# Patient Record
Sex: FEMALE | Employment: PART TIME | URBAN - METROPOLITAN AREA
[De-identification: names, ages, dates, MRNs, and addresses within clinical notes are randomized per-mention and may not be internally consistent; named-entity substitution may affect disease eponyms.]

---

## 2017-02-10 ENCOUNTER — OFFICE VISIT (OUTPATIENT)
Dept: PEDIATRICS | Facility: CLINIC | Age: 52
End: 2017-02-10
Payer: COMMERCIAL

## 2017-02-10 VITALS
HEIGHT: 61 IN | DIASTOLIC BLOOD PRESSURE: 70 MMHG | OXYGEN SATURATION: 97 % | HEART RATE: 84 BPM | BODY MASS INDEX: 33.76 KG/M2 | TEMPERATURE: 98.2 F | SYSTOLIC BLOOD PRESSURE: 118 MMHG | WEIGHT: 178.8 LBS

## 2017-02-10 DIAGNOSIS — N64.4 BREAST PAIN: Primary | ICD-10-CM

## 2017-02-10 DIAGNOSIS — I83.813 VARICOSE VEINS OF BILATERAL LOWER EXTREMITIES WITH PAIN: ICD-10-CM

## 2017-02-10 DIAGNOSIS — H53.9 VISION CHANGES: ICD-10-CM

## 2017-02-10 DIAGNOSIS — K29.50 CHRONIC GASTRITIS, PRESENCE OF BLEEDING UNSPECIFIED, UNSPECIFIED GASTRITIS TYPE: ICD-10-CM

## 2017-02-10 DIAGNOSIS — M79.7 FIBROMYALGIA: ICD-10-CM

## 2017-02-10 PROCEDURE — 99215 OFFICE O/P EST HI 40 MIN: CPT | Performed by: INTERNAL MEDICINE

## 2017-02-10 RX ORDER — AMITRIPTYLINE HYDROCHLORIDE 10 MG/1
TABLET ORAL
Qty: 90 TABLET | Refills: 3 | Status: SHIPPED | OUTPATIENT
Start: 2017-02-10 | End: 2017-11-14

## 2017-02-10 RX ORDER — NAPROXEN 500 MG/1
500 TABLET ORAL 2 TIMES DAILY WITH MEALS
Qty: 60 TABLET | Refills: 1 | Status: SHIPPED | OUTPATIENT
Start: 2017-02-10 | End: 2017-11-14

## 2017-02-10 RX ORDER — IBUPROFEN 600 MG/1
600 TABLET, FILM COATED ORAL
COMMUNITY
Start: 2012-07-18 | End: 2017-11-14

## 2017-02-10 NOTE — PATIENT INSTRUCTIONS
Stop using ibuprofen and start taking naprosyn 1 pill twice daily with food.  They will call you to schedule.  You can call radiology scheduling at [phone] to schedule yourself.    Start taking omeprazole - take it once daily about 30-60 mins before a meal.      They will call you to schedule with the surgeon about your legs.      Please call the eye doctor, you should be seen soon to check your vision change.    Start taking the amitriptyline 1 tab 30-60 mins before bed to help with sleep and pain.  You can gradually increase the dose up to 3 tabs nightly.
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
## 2017-02-10 NOTE — NURSING NOTE
"Chief Complaint   Patient presents with     Breast Mass       Initial /70 mmHg  Pulse 84  Temp(Src) 98.2  F (36.8  C) (Tympanic)  Ht 5' 1\" (1.549 m)  Wt 178 lb 12.8 oz (81.103 kg)  BMI 33.80 kg/m2  SpO2 97% Estimated body mass index is 33.8 kg/(m^2) as calculated from the following:    Height as of this encounter: 5' 1\" (1.549 m).    Weight as of this encounter: 178 lb 12.8 oz (81.103 kg).  Medication Reconciliation: complete  "

## 2017-02-10 NOTE — MR AVS SNAPSHOT
After Visit Summary   2/10/2017    Perlita Rashid    MRN: [id number]           Patient Information     Date Of Birth          1965        Visit Information        Provider Department      2/10/2017 2:15 PM Reema Shen MD; [facility]        Today's Diagnoses     Breast pain    -  1     Vision changes         Varicose veins of bilateral lower extremities with pain         Chronic gastritis, presence of bleeding unspecified, unspecified gastritis type         Fibromyalgia           Care Instructions    Stop using ibuprofen and start taking naprosyn 1 pill twice daily with food.  They will call you to schedule.  You can call radiology scheduling at [phone] to schedule yourself.    Start taking omeprazole - take it once daily about 30-60 mins before a meal.      They will call you to schedule with the surgeon about your legs.      Please call the eye doctor, you should be seen soon to check your vision change.    Start taking the amitriptyline 1 tab 30-60 mins before bed to help with sleep and pain.  You can gradually increase the dose up to 3 tabs nightly.        Follow-ups after your visit        Additional Services     OPHTHALMOLOGY ADULT REFERRAL       Your provider has referred you to: [facility]: [facility] [place] [facility] [phone]   [email or web address]    Please be aware that coverage of these services is subject to the terms and limitations of your health insurance plan.  Call member services at your health plan with any benefit or coverage questions.      Please bring the following with you to your appointment:    (1) Any X-Rays, CTs or MRIs which have been performed.  Contact the facility where they were done to arrange for  prior to your scheduled appointment.    (2) List of current medications  (3) This referral request   (4) Any documents/labs given to you for this referral            VASCULAR SURGERY REFERRAL       Your  "provider has referred you to: **[facility] [phone] - Varicose Veins & None - Please Order Appropriate Testing   [email or web address]    Please be aware that coverage of these services is subject to the terms and limitations of your health insurance plan.  Call member services at your health plan with any benefit or coverage questions.      Please bring the following with you to your appointment:    (1) Any X-Rays, CTs or MRIs which have been performed.  Contact the facility where they were done to arrange for  prior to your scheduled appointment.    (2) List of current medications   (3) This referral request   (4) Any documents/labs given to you for this referral                  Future tests that were ordered for you today     Open Future Orders        Priority Expected Expires Ordered    MA Diagnostic Digital Bilateral Routine  2/10/2018 2/10/2017            Who to contact     If you have questions or need follow up information about today's clinic visit or your schedule please contact [facility] directly at [phone].  Normal or non-critical lab and imaging results will be communicated to you by MyChart, letter or phone within 4 business days after the clinic has received the results. If you do not hear from us within 7 days, please contact the clinic through MyChart or phone. If you have a critical or abnormal lab result, we will notify you by phone as soon as possible.  Submit refill requests through [employer] or call your pharmacy and they will forward the refill request to us. Please allow 3 business days for your refill to be completed.          Additional Information About Your Visit        [employer] Information     2[employer] lets you send messages to your doctor, view your test results, renew your prescriptions, schedule appointments and more. To sign up, go to [email or web address] . Click on \"Log in\" on the left side of the screen, " "which will take you to the Welcome page. Then click on \"Sign up Now\" on the right side of the page.     You will be asked to enter the access code listed below, as well as some personal information. Please follow the directions to create your username and password.     Your access code is: [email or web address]  Expires: 2017  3:23 PM     Your access code will  in 90 days. If you need help or a new code, please call your [facility] or [phone].        Care EveryWhere ID     This is your Care EveryWhere ID. This could be used by other organizations to access your [facility] medical records  [id number]        Your Vitals Were     Pulse Temperature Height BMI (Body Mass Index) Pulse Oximetry       84 98.2  F (36.8  C) (Tympanic) 5' 1\" (1.549 m) 33.80 kg/m2 97%        Blood Pressure from Last 3 Encounters:   02/10/17 118/70   16 138/80   16 116/74    Weight from Last 3 Encounters:   02/10/17 178 lb 12.8 oz (81.103 kg)   16 173 lb (78.472 kg)   16 171 lb (77.565 kg)              We Performed the Following     OPHTHALMOLOGY ADULT REFERRAL     VASCULAR SURGERY REFERRAL          Today's Medication Changes          These changes are accurate as of: 2/10/17  3:23 PM.  If you have any questions, ask your nurse or doctor.               Start taking these medicines.        Dose/Directions    amitriptyline 10 MG tablet   Commonly known as:  ELAVIL   Used for:  Fibromyalgia   Started by:  Reema Shen MD        Start at 1 tab at bedtime for 3 days, then 2 tabs at bedtime for 3 days, then 3 tabs at bedtime.   Quantity:  90 tablet   Refills:  3       naproxen 500 MG tablet   Commonly known as:  NAPROSYN   Used for:  Breast pain   Started by:  Reema Shen MD        Dose:  500 mg   Take 1 tablet (500 mg) by mouth 2 times daily (with meals)   Quantity:  60 tablet   Refills:  1       omeprazole 20 MG CR capsule   Commonly known as:  priLOSEC   Used for:  Chronic gastritis, presence of bleeding " unspecified, unspecified gastritis type   Started by:  Reema Shen MD        Dose:  20 mg   Take 1 capsule (20 mg) by mouth daily   Quantity:  30 capsule   Refills:  1            Where to get your medicines      These medications were sent to [facility] [place] - [place] - [address]   [address]  [address], [place] [address]     Phone:  [phone]    - amitriptyline 10 MG tablet  - naproxen 500 MG tablet  - omeprazole 20 MG CR capsule             Primary Care Provider    None Specified       No primary provider on file.        Thank you!     Thank you for [facility]  for your care. Our goal is always to provide you with excellent care. Hearing back from our patients is one way we can continue to improve our services. Please take a few minutes to complete the written survey that you may receive in the mail after your visit with us. Thank you!             Your Updated Medication List - Protect others around you: Learn how to safely use, store and throw away your medicines at www.disposemymeds.org.          This list is accurate as of: 2/10/17  3:23 PM.  Always use your most recent med list.                   Brand Name Dispense Instructions for use    amitriptyline 10 MG tablet    ELAVIL    90 tablet    Start at 1 tab at bedtime for 3 days, then 2 tabs at bedtime for 3 days, then 3 tabs at bedtime.       ibuprofen 600 MG tablet    ADVIL/MOTRIN     Take 600 mg by mouth       naproxen 500 MG tablet    NAPROSYN    60 tablet    Take 1 tablet (500 mg) by mouth 2 times daily (with meals)       omeprazole 20 MG CR capsule    priLOSEC    30 capsule    Take 1 capsule (20 mg) by mouth daily

## 2017-04-07 ENCOUNTER — TELEPHONE (OUTPATIENT)
Dept: PEDIATRICS | Facility: CLINIC | Age: 52
End: 2017-04-07

## 2017-04-07 NOTE — TELEPHONE ENCOUNTER
Type of outreach:  Phone, left message for patient to call back.   Health Maintenance Due   Topic Date Due     JOSSIE QUESTIONNAIRE 1 YEAR  1965     PHQ-9 Q1YR (NO INBASKET)  1965     HPV Q5 YEARS (Complete with PAP)  07/31/1996     MAMMO SCREEN Q2 YR (SYSTEM ASSIGNED)  07/31/2006     LIPID SCREEN Q5 YR FEMALE (SYSTEM ASSIGNED)  07/31/2011     Joslyn Bray CMA ([facility])

## 2017-05-03 ENCOUNTER — APPOINTMENT (OUTPATIENT)
Dept: GENERAL RADIOLOGY | Facility: CLINIC | Age: 52
End: 2017-05-03
Attending: NURSE PRACTITIONER
Payer: COMMERCIAL

## 2017-05-03 ENCOUNTER — APPOINTMENT (OUTPATIENT)
Dept: ULTRASOUND IMAGING | Facility: CLINIC | Age: 52
End: 2017-05-03
Attending: NURSE PRACTITIONER
Payer: COMMERCIAL

## 2017-05-03 ENCOUNTER — HOSPITAL ENCOUNTER (EMERGENCY)
Facility: CLINIC | Age: 52
Discharge: HOME | End: 2017-05-03
Attending: NURSE PRACTITIONER | Admitting: NURSE PRACTITIONER
Payer: COMMERCIAL

## 2017-05-03 ENCOUNTER — OFFICE VISIT (OUTPATIENT)
Dept: PEDIATRICS | Facility: CLINIC | Age: 52
End: 2017-05-03
Payer: COMMERCIAL

## 2017-05-03 VITALS
TEMPERATURE: 98.6 F | SYSTOLIC BLOOD PRESSURE: 157 MMHG | OXYGEN SATURATION: 98 % | DIASTOLIC BLOOD PRESSURE: 94 MMHG | RESPIRATION RATE: 20 BRPM

## 2017-05-03 VITALS
SYSTOLIC BLOOD PRESSURE: 132 MMHG | DIASTOLIC BLOOD PRESSURE: 80 MMHG | TEMPERATURE: 97.6 F | BODY MASS INDEX: 34.74 KG/M2 | HEIGHT: 61 IN | OXYGEN SATURATION: 98 % | WEIGHT: 184 LBS | HEART RATE: 80 BPM

## 2017-05-03 DIAGNOSIS — M79.604 PAIN OF RIGHT LOWER EXTREMITY: ICD-10-CM

## 2017-05-03 DIAGNOSIS — S70.11XA: ICD-10-CM

## 2017-05-03 DIAGNOSIS — S89.91XA LEG INJURY, RIGHT, INITIAL ENCOUNTER: Primary | ICD-10-CM

## 2017-05-03 DIAGNOSIS — T14.8XXA HEMATOMA: ICD-10-CM

## 2017-05-03 LAB
ERYTHROCYTE [DISTWIDTH] IN BLOOD BY AUTOMATED COUNT: 14.3 % (ref 10–15)
HCT VFR BLD AUTO: 36.3 % (ref 35–47)
HGB BLD-MCNC: 11.9 G/DL (ref 11.7–15.7)
MCH RBC QN AUTO: 30 PG (ref 26.5–33)
MCHC RBC AUTO-ENTMCNC: 32.8 G/DL (ref 31.5–36.5)
MCV RBC AUTO: 91 FL (ref 78–100)
PLATELET # BLD AUTO: 303 10E9/L (ref 150–450)
RBC # BLD AUTO: 3.97 10E12/L (ref 3.8–5.2)
WBC # BLD AUTO: 8.3 10E9/L (ref 4–11)

## 2017-05-03 PROCEDURE — 99284 EMERGENCY DEPT VISIT MOD MDM: CPT | Mod: 25

## 2017-05-03 PROCEDURE — 36415 COLL VENOUS BLD VENIPUNCTURE: CPT | Performed by: NURSE PRACTITIONER

## 2017-05-03 PROCEDURE — 99214 OFFICE O/P EST MOD 30 MIN: CPT | Mod: GC | Performed by: INTERNAL MEDICINE

## 2017-05-03 PROCEDURE — 93971 EXTREMITY STUDY: CPT | Mod: RT

## 2017-05-03 PROCEDURE — 73590 X-RAY EXAM OF LOWER LEG: CPT | Mod: RT

## 2017-05-03 PROCEDURE — 25000132 ZZH RX MED GY IP 250 OP 250 PS 637: Performed by: NURSE PRACTITIONER

## 2017-05-03 PROCEDURE — 85027 COMPLETE CBC AUTOMATED: CPT | Performed by: NURSE PRACTITIONER

## 2017-05-03 RX ORDER — HYDROCODONE BITARTRATE AND ACETAMINOPHEN 5; 325 MG/1; MG/1
1-2 TABLET ORAL EVERY 4 HOURS PRN
Qty: 15 TABLET | Refills: 0 | Status: SHIPPED | OUTPATIENT
Start: 2017-05-03 | End: 2017-11-14

## 2017-05-03 RX ORDER — HYDROCODONE BITARTRATE AND ACETAMINOPHEN 5; 325 MG/1; MG/1
1 TABLET ORAL ONCE
Status: COMPLETED | OUTPATIENT
Start: 2017-05-03 | End: 2017-05-03

## 2017-05-03 RX ADMIN — HYDROCODONE BITARTRATE AND ACETAMINOPHEN 1 TABLET: 5; 325 TABLET ORAL at 19:35

## 2017-05-03 ASSESSMENT — ENCOUNTER SYMPTOMS: NUMBNESS: 0
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
## 2017-05-03 NOTE — NURSING NOTE
"Chief Complaint   Patient presents with     Fall     Musculoskeletal Problem       Initial /80 (BP Location: Right arm, Patient Position: Chair, Cuff Size: Adult Regular)  Pulse 80  Temp 97.6  F (36.4  C) (Tympanic)  Ht 5' 1\" (1.549 m)  Wt 184 lb (83.5 kg)  SpO2 98%  BMI 34.77 kg/m2 Estimated body mass index is 34.77 kg/(m^2) as calculated from the following:    Height as of this encounter: 5' 1\" (1.549 m).    Weight as of this encounter: 184 lb (83.5 kg).  Medication Reconciliation: complete  "

## 2017-05-03 NOTE — PATIENT INSTRUCTIONS
- you were sent to [facility] for a leg MRI (a test that takes a picture of your leg muscles)  - if there is an abnormal finding, we will let you know  - otherwise, you are referred to see a sports medicine doctor to evaluate your leg (this appointment will be tomorrow)  - you were given pain medicine to treat your leg pain. Do not drive or drink alcohol while on this  - you had blood work taken to evaluate your blood counts, you will be contacted with the results  - if you have worse pain in your leg or worse numbness or tingling in your leg or foot or if you have worse dizziness, go to the emergency room tonight  - call clinic with any questions

## 2017-05-03 NOTE — ED AVS SNAPSHOT
[facility] Emergency Department    [address] [facility] [address]    Phone:  [phone]    Fax:  [phone]                                       Perlita Koo   MRN: [id number]    Department:  [facility] Emergency Department   Date of Visit:  5/3/2017           Patient Information     Date Of Birth          1965        Your diagnoses for this visit were:     Pain of right lower extremity     Traumatic ecchymosis of thigh, right, initial encounter        You were seen by Yung Culp, MICHAEL CNP.      Follow-up Information     Call [facility].    Why:  tomorrow to schedule appointment for follow up    Contact information:    [address]  [address]  [facility] [id number] [phone]        Follow up with [facility] Emergency Department.    Specialty:  EMERGENCY MEDICINE    Why:  If symptoms worsen    Contact information:    [address] [facility] [id number] [phone]        Discharge Instructions         Contusión:Extremidad Inferior [Contusion: Lower Extremity]  Usted tiene ryan CONTUSIÓN en ryan extremidad INFERIOR (pierna, rodilla, tobillo, pie o dedos del pie). [place] produce dolor localizado, hinchazón y a veces moretones. No tiene ningún hueso roto. Esta lesión puede tardar varios días o incluso semanas en sanar.  Cuidados En [place]:  1) Mantenga la pierna elevada para reducir el dolor y la hinchazón. Para dormir, coloque ryan almohada debajo de la pierna lesionada. Cuando se siente, apoye la pierna lesionada sobre un objeto que la soporte de forma que quede al mismo nivel que vora cintura. [place] es muy importante mor las primeras 48 horas.  2) Si le recomendaron el uso de MULETAS, no apoye todo vora peso en la pierna lesionada hasta que pueda hacerlo sin sentir dolor. Podrá volver a hacer deportes cuando sea capaz de saltar y correr con la pierna lesionada sin sentir  dolor.  3) Para aliviar el dolor mor el primer día, aplique ryan bolsa de hielo (cubitos de hielo en ryan bolsa de plástico, envuelta en ryan toalla) sobre la elizabeth lesionada mor 20 minutos cada 1-2 horas. Continúe esta práctica 3-4 veces al día hasta que el dolor y la hinchazón desaparezcan.  4) Puede usar acetaminofén (Tylenol) o ibuprofeno (Motrin o Advil) para controlar el dolor, a menos que le hayan recetado otro medicamento. [ NOTA : Si tiene ryan enfermedad hepática o renal crónica, o ha tenido alguna vez ryan úlcera estomacal o sangrado gastrointestinal, consulte con vora médico antes de mk estos medicamentos.]  Programe ryan VISITA DE CONTROL con vora médico o con ally centro si no empieza a mejorar en los próximos SHAHBAZ días.  [NOTA: Si le hicieron radiografías, estas serán examinadas por un radiólogo y le informarán de los nuevos hallazgos que puedan afectar la atención médica que necesita.]  Busque Prontamente Atención Médica  si algo de lo siguiente ocurre:  -- Aumento del dolor o aumento de la hinchazón  -- Dedos (del pie) fríos, azulados, entumecidos o con hormigueo  -- Enrojecimiento, calor o supuración en la piel    5687-7243 [employer]. [address] [place] [address], [place] [address]. Todos los derechos reservados. Esta información no pretende sustituir la atención médica profesional. Sólo vora médico puede diagnosticar y tratar un problema de rodolfo.          R.I.C.E.  R.I.C.E. es el acrónimo de descansar, ponerse hielo, hacer presión y tener levantada la elizabeth lesionada, son medidas que le ayudan a tener menos dolor e hinchazón después de ryan distensión, esguince, magulladura o golpe britta. Si se ha lesionado, siga estas sugerencias para km medidas lo antes posible.  [place]  El dolor es la forma que tiene el cuerpo de decir que el área lesionada debe descansar. Ya sea que se haya golpeado el codo, la mano, el pie o la rodilla, limite el uso de la parte lesionada para evitar un mayor  daño y ayudarse a sanar.  Hielo  Colocarse hielo inmediatamente después de ryan lesión ayuda a evitar la hinchazón y a calmar el dolor. No ponga el hielo directamente sobre la piel.    Envuelva ryan bolsa de hielo en ryan radha delgada y colóquela sobre el área lesionada.    Póngase el hielo mor 10 minutos cada 3 horas, jose no más de 20 minutos cada vez.  Compresión  Hacer presión (compresión) sobre la lesión ayuda a evitar la hinchazón y sirve de soporte.    Vende firmemente el área lesionada con ryan venda elástica. Si siente cosquilleo en la mano o el pie, cambian de color o los siente fríos cuando los toca, puede ser que el vendaje esté muy ajustado. Koko un nuevo vendaje más flojo.     Si el vendaje se afloja demasiado, vuelva a vendarse.    No use ryan venda elástica mor toda la noche.  Elevación  La elevación es más eficaz cuando se mantiene la lesión elevada más alto que el nivel del corazón. Mantener ryan lesión elevada ayuda a reducir la hinchazón, el dolor y la sensación palpitante.  Llame a vora proveedor de atención médica si nota cualquiera de estos síntomas:    Los dedos de la mano o del pie están adormecidos, weems cambiado de color o están fríos cuando los toca.    La piel está brillante o estirada.    Empeora el dolor, la hinchazón o el moretón, y no mejoran cuando la lesión se levanta.         9104-6657 [employer]. [address] [place] [address]. Todos los derechos reservados. Esta información no pretende sustituir la atención médica profesional. Sólo vora médico puede diagnosticar y tratar un problema de rodolfo.          Contusión, Tejidos Blandos [Contusion, Soft Tissue]  Usted tiene ryan CONTUSIÓN que es un moretón con hinchazón (inflamación) y sangrado por debajo de la piel. No hay huesos quebrados. Esta herida jamin de unos días a varias semanas en sanarse.  Cuidado En [place]:  1) Mantenga la parte lastimada elevada para reducir la inflamación/hinchazón. [place] es muy  importante mor las primeras 48 horas.  2) Koko ryan compresa fría (pedazos de hielo en ryan bolsa plástica envuelta en ryan toalla) y póngala a la parte afectada por 20 minutos cada ryan (1) a 2 horas mor el primer día. Siga haciendo esto 3 a 4 veces al día hasta que baja la hinchazón.  3) Puede usar acetaminofén (Tylenol) o ibuprofeno (Motrin, Advil) para controlar el dolor, a menos que le receten otro medicamento para el dolor. [ NOTA : Si sufre de enfermedad del hígado o riñones, o alguna vez tuvo ryan úlcera estomacal o sangrado gastrointestinal, hable con vora médico antes de usar estos medicamentos.]  No use ibuprofeno con niños menores de seis meses de edad.  Seguimiento  con vora médico o a esta institución si usted no ha antonia dentro de SHAHBAZ (3) días.  [NOTA: Si le tomaron radiografías (elijah X), serán revisadas por un médico radiólogo. Le informamos de cualquier cambio que pueda afectar vora tratamiento.]  Busque Prontamente Atención Médica  si algo de lo siguiente ocurre:  -- Aumento del dolor o de la hinchazón  -- Enrojecimiento, calentura o drenaje (supuración) de la piel  -- La pierna o mano herida se pone frío, azuloso, adormecido (le falta sensación) o siente hormigueo  -- Fiebre por encima de los 99.5  F (37.5  C) grados oral    [id number] [employer]. [address] [place] [address]. Todos los derechos reservados. Esta información no pretende sustituir la atención médica profesional. Sólo vora médico puede diagnosticar y tratar un problema de rodolfo.          Cómo se tratan las distensiones y los esguinces  Las distensiones y los esguinces ocurren cuando se tensionan o rompen los músculos u otros tejidos blandos que están cerca de los huesos. Estas lesiones pueden causar moretones, hinchazón y dolor. Para aliviar valdez molestias y hacer que la distensión o esguince mejore más rápido, siga estas sugerencias y recuerde que se pueden demorar de 6 a 8 semanas para curarse.     Nota  importante: No les dé aspirina a los niños ni a los adolescentes.         Para ayudar a calmar el dolor y la hinchazón, póngase hielo, véndese y levante la elizabeth lesionada.    [place] hielo, después calor    Use hielo mor las primeras 24-48 horas después de la lesión. El hielo ayuda a evitar la hinchazón y calma el dolor. No use hielo sobre la lesión mor más de 20 minutos cada vez.    Aplique calor después de las primeras 48 horas. El calor relaja los músculos y aumenta el flujo de clotilde. Ponga el área de la lesión en agua tibia o use ryan almohadilla de calor graduada en bajo, no más de 15 minutos cada vez.  Póngase ryan venda y levante la elizabeth lesionada    Vende firmemente la extremidad lesionada con ryan venda elástica; esto le dará soporte y ayudará a evitar la hinchazón. No use ryan venda elástica mor toda la noche.    [place] la elizabeth lesionada para ayudar a disminuir la hinchazón y las punzadas. Es mejor levantar la extremidad lesionada por encima de la altura del corazón.    Medicamentos    Los medicamentos que se venden sin receta, eliana la aspirina, el acetaminofeno o el ibuprofeno, pueden ayudar a calmar el dolor. Algunos también ayudan a disminuir la hinchazón.    [place] los medicamentos solamente eliana le hayan indicado.    Descanse la elizabeth de la lesión aunque los medicamentos le hayan calmado el dolor.  [place]    Para que la elizabeth lesionada descanse, no la use mor 24 horas.    Ryan vez que esté listo, regrese poco a poco a valdez actividades habituales. Descanse frecuentemente el área lesionada.    Si le duele, no use la extremidad lesionada, ni se apoye en lamine para caminar.    [id number] [employer]. [address] [place] [address], [place] [address]. Todos los derechos reservados. Esta información no pretende sustituir la atención médica profesional. Sólo vora médico puede diagnosticar y tratar un problema de rodolfo.          Future Appointments        Provider Department Dept Phone Center     5/4/2017 3:20 PM Khurram Dasilva MD [facility] [phone] [facility]      24 Hour Appointment Hotline       To make an appointment at any [place] clinic, call [phone] ([phone]). If you don't have a family doctor or clinic, we will help you find one. [place] clinics are conveniently located to serve the needs of you and your family.             Review of your medicines      Our records show that you are taking the medicines listed below. If these are incorrect, please call your family doctor or clinic.        Dose / Directions Last dose taken    amitriptyline 10 MG tablet   Commonly known as:  ELAVIL   Quantity:  90 tablet        Start at 1 tab at bedtime for 3 days, then 2 tabs at bedtime for 3 days, then 3 tabs at bedtime.   Refills:  3        HYDROcodone-acetaminophen 5-325 MG per tablet   Commonly known as:  NORCO   Dose:  1-2 tablet   Quantity:  15 tablet        Take 1-2 tablets by mouth every 4 hours as needed for moderate to severe pain   Refills:  0        ibuprofen 600 MG tablet   Commonly known as:  ADVIL/MOTRIN   Dose:  600 mg        Take 600 mg by mouth   Refills:  0        naproxen 500 MG tablet   Commonly known as:  NAPROSYN   Dose:  500 mg   Quantity:  60 tablet        Take 1 tablet (500 mg) by mouth 2 times daily (with meals)   Refills:  1                Procedures and tests performed during your visit     CBC (platelets, no diff)    Tib/Fib XR, right    US Lower Extremity Venous Duplex Right      Orders Needing Specimen Collection     None      Pending Results     Date and Time Order Name Status Description    5/3/2017 1930 US Lower Extremity Venous Duplex Right Preliminary     5/3/2017 1930 Tib/Fib XR, right Preliminary             Pending Culture Results     No orders found from 5/1/2017 to 5/4/2017.            Pending Results Instructions     If you had any lab results that were not finalized at the time of your Discharge, you  can call the ED Lab Result RN at [phone]. You will be contacted by this team for any positive Lab results or changes in treatment. The nurses are available 7 days a week from 10A to 6:30P.  You can leave a message 24 hours per day and they will return your call.        Test Results From Your Hospital Stay        5/3/2017  9:11 PM      Narrative     TIBIA AND FIBULA RIGHT TWO VIEWS  5/3/2017 9:09 PM     HISTORY: Fall pain, bruising    COMPARISON: None.        Impression     IMPRESSION: No bony abnormalities. Mild subcutaneous edema throughout  much of the lower leg.         5/3/2017  9:02 PM      Narrative     VENOUS DOPPLER RIGHT LOWER EXTREMITY 5/3/2017 9:00 PM    HISTORY: Right lower extremity pain and swelling.    COMPARISON: None.    FINDINGS: Color-flow imaging and Doppler waveform spectral analysis  were utilized. There is normal compressibility and spontaneous flow  throughout the right common femoral, superficial femoral, popliteal  and visualized calf veins. An area of bruising in the upper medial  right thigh was examined and shows no evidence for underlying hematoma  or other abnormality.        Impression     IMPRESSION: No evidence for deep venous thrombosis.         5/3/2017  8:14 PM      Component Results     Component Value Ref Range & Units Status    WBC 8.3 4.0 - 11.0 10e9/L Final    RBC Count 3.97 3.8 - 5.2 10e12/L Final    Hemoglobin 11.9 11.7 - 15.7 g/dL Final    Hematocrit 36.3 35.0 - 47.0 % Final    MCV 91 78 - 100 fl Final    MCH 30.0 26.5 - 33.0 pg Final    MCHC 32.8 31.5 - 36.5 g/dL Final    RDW 14.3 10.0 - 15.0 % Final    Platelet Count 303 150 - 450 10e9/L Final                Clinical Quality Measure: Blood Pressure Screening     Your blood pressure was checked while you were in the emergency department today. The last reading we obtained was  BP: (!) 155/99 . Please read the guidelines below about what these numbers mean and what you should do about them.  If your systolic blood  "pressure (the top number) is less than 120 and your diastolic blood pressure (the bottom number) is less than 80, then your blood pressure is normal. There is nothing more that you need to do about it.  If your systolic blood pressure (the top number) is 120-139 or your diastolic blood pressure (the bottom number) is 80-89, your blood pressure may be higher than it should be. You should have your blood pressure rechecked within a year by a primary care provider.  If your systolic blood pressure (the top number) is 140 or greater or your diastolic blood pressure (the bottom number) is 90 or greater, you may have high blood pressure. High blood pressure is treatable, but if left untreated over time it can put you at risk for heart attack, stroke, or kidney failure. You should have your blood pressure rechecked by a primary care provider within the next 4 weeks.  If your provider in the emergency department today gave you specific instructions to follow-up with your doctor or provider even sooner than that, you should follow that instruction and not wait for up to 4 weeks for your follow-up visit.        Thank you for choosing [facility]       Thank you for choosing [facility] for your care. Our goal is always to provide you with excellent care. Hearing back from our patients is one way we can continue to improve our services. Please take a few minutes to complete the written survey that you may receive in the mail after you visit with us. Thank you!        [employer] Information     [employer] lets you send messages to your doctor, view your test results, renew your prescriptions, schedule appointments and more. To sign up, go to [email or web address] . Click on \"Log in\" on the left side of the screen, which will take you to the Welcome page. Then click on \"Sign up Now\" on the right side of the page.     You will be asked to enter the access code listed below, as well as some personal information. Please follow the " directions to create your username and password.     Your access code is: [email or web address]  Expires: 2017  4:23 PM     Your access code will  in 90 days. If you need help or a new code, please call your [facility] clinic or [phone].        Care EveryWhere ID     This is your Care EveryWhere ID. This could be used by other organizations to access your [facility] medical records  [id number]        After Visit Summary       This is your record. Keep this with you and show to your community pharmacist(s) and doctor(s) at your next visit.

## 2017-05-03 NOTE — ED NOTES
Pt had injury to right leg one week ago while slipping on stairs.  Dark purple bruising noted.

## 2017-05-03 NOTE — MR AVS SNAPSHOT
After Visit Summary   5/3/2017    Perlita Koo    MRN: [id number]           Patient Information     Date Of Birth          1965        Visit Information        Provider Department      5/3/2017 3:30 PM Keith Chiang MD; [facility] [employer] [facility]        Today's Diagnoses     Leg injury, right, initial encounter    -  1      Care Instructions    - you were sent to [facility] for a leg MRI (a test that takes a picture of your leg muscles)  - if there is an abnormal finding, we will let you know  - otherwise, you are referred to see a sports medicine doctor to evaluate your leg (this appointment will be tomorrow)  - you were given pain medicine to treat your leg pain. Do not drive or drink alcohol while on this  - you had blood work taken to evaluate your blood counts, you will be contacted with the results  - if you have worse pain in your leg or worse numbness or tingling in your leg or foot or if you have worse dizziness, go to the emergency room tonight  - call clinic with any questions        Follow-ups after your visit        Additional Services     ORTHO  REFERRAL       [facility] is referring you to the Orthopedic  Services at [facility].       The  Representative will assist you in the coordination of your Orthopedic and Musculoskeletal Care as prescribed by your physician.    The  Representative will call you within 1 business day to help schedule your appointment, or you may contact the  Representative at:    All areas ~ [phone]     Type of Referral : right leg/thigh injury       Timeframe requested: 1 - 2 days    Coverage of these services is subject to the terms and limitations of your health insurance plan.  Please call member services at your health plan with any benefit or coverage questions.      If X-rays, CT or MRI's have been performed,  "please contact the facility where they were done to arrange for , prior to your scheduled appointment.  Please bring this referral request to your appointment and present it to your specialist.                  Your next 10 appointments already scheduled     May 04, 2017  3:20 PM CDT   New Visit with Khurram Dasilva MD   [facility] ([facility] [place])    [address] [facility]  [place] [address] [phone]              Who to contact     If you have questions or need follow up information about today's clinic visit or your schedule please contact [facility] directly at [phone].  Normal or non-critical lab and imaging results will be communicated to you by MyChart, letter or phone within 4 business days after the clinic has received the results. If you do not hear from us within 7 days, please contact the clinic through [employer] or phone. If you have a critical or abnormal lab result, we will notify you by phone as soon as possible.  Submit refill requests through [employer] or call your pharmacy and they will forward the refill request to us. Please allow 3 business days for your refill to be completed.          Additional Information About Your Visit        [employer] Information     [employer] lets you send messages to your doctor, view your test results, renew your prescriptions, schedule appointments and more. To sign up, go to [email or web address] . Click on \"Log in\" on the left side of the screen, which will take you to the Welcome page. Then click on \"Sign up Now\" on the right side of the page.     You will be asked to enter the access code listed below, as well as some personal information. Please follow the directions to create your username and password.     Your access code is: [email or web address]  Expires: 2017  4:23 PM     Your access code will  in 90 days. If you need help or a new code, " "please call your [facility] clinic or [phone].        Care EveryWhere ID     This is your Care EveryWhere ID. This could be used by other organizations to access your [facility] medical records  [id number]        Your Vitals Were     Pulse Temperature Height Pulse Oximetry BMI (Body Mass Index)       80 97.6  F (36.4  C) (Tympanic) 5' 1\" (1.549 m) 98% 34.77 kg/m2        Blood Pressure from Last 3 Encounters:   05/03/17 132/80   02/10/17 118/70   07/13/16 138/80    Weight from Last 3 Encounters:   05/03/17 184 lb (83.5 kg)   02/10/17 178 lb 12.8 oz (81.1 kg)   07/13/16 173 lb (78.5 kg)              We Performed the Following     Basic metabolic panel  (Ca, Cl, CO2, Creat, Gluc, K, Na, BUN)     CBC with platelets     MR Low Ext Joint Rt w/o Contrast     ORTHO  REFERRAL          Today's Medication Changes          These changes are accurate as of: 5/3/17  4:55 PM.  If you have any questions, ask your nurse or doctor.               Start taking these medicines.        Dose/Directions    HYDROcodone-acetaminophen 5-325 MG per tablet   Commonly known as:  NORCO   Used for:  Leg injury, right, initial encounter   Started by:  Keith Chiang MD        Dose:  1-2 tablet   Take 1-2 tablets by mouth every 4 hours as needed for moderate to severe pain   Quantity:  15 tablet   Refills:  0            Where to get your medicines      Some of these will need a paper prescription and others can be bought over the counter.  Ask your nurse if you have questions.     Bring a paper prescription for each of these medications     HYDROcodone-acetaminophen 5-325 MG per tablet                Primary Care Provider Office Phone # Fax #    Ilsa Pereira [phone]       [facility] [address] [place] [address]        Thank you!     Thank you for choosing [facility]3]  for your care. Our goal is always to provide you with excellent care. Hearing back from our patients is " one way we can continue to improve our services. Please take a few minutes to complete the written survey that you may receive in the mail after your visit with us. Thank you!             Your Updated Medication List - Protect others around you: Learn how to safely use, store and throw away your medicines at www.disposemymeds.org.          This list is accurate as of: 5/3/17  4:55 PM.  Always use your most recent med list.                   Brand Name Dispense Instructions for use    amitriptyline 10 MG tablet    ELAVIL    90 tablet    Start at 1 tab at bedtime for 3 days, then 2 tabs at bedtime for 3 days, then 3 tabs at bedtime.       HYDROcodone-acetaminophen 5-325 MG per tablet    NORCO    15 tablet    Take 1-2 tablets by mouth every 4 hours as needed for moderate to severe pain       ibuprofen 600 MG tablet    ADVIL/MOTRIN     Take 600 mg by mouth       naproxen 500 MG tablet    NAPROSYN    60 tablet    Take 1 tablet (500 mg) by mouth 2 times daily (with meals)

## 2017-05-03 NOTE — ED AVS SNAPSHOT
[facility] Emergency Department    [address]    [facility] [address]    Phone:  [phone]    Fax:  [phone]                                       Perlita Koo   MRN: [id number]    Department:  [facility] Emergency Department   Date of Visit:  5/3/2017           After Visit Summary Signature Page     I have received my discharge instructions, and my questions have been answered. I have discussed any challenges I see with this plan with the nurse or doctor.    ..........................................................................................................................................  Patient/Patient Representative Signature      ..........................................................................................................................................  Patient Representative Print Name and Relationship to Patient    ..................................................               ................................................  Date                                            Time    ..........................................................................................................................................  Reviewed by Signature/Title    ...................................................              ..............................................  Date                                                            Time

## 2017-05-04 NOTE — ED NOTES
"Using  phone. A/O. VSS. Pt verbalized understanding of d/c instructions. During  phone conversation pt  interrupted and states, \"Is this necessary\" Pt non english speaking, spouse english speaking. Pt taken to lobby in w/c.    "

## 2017-05-04 NOTE — ED PROVIDER NOTES
History     Chief Complaint:  Leg Injury      HPI   Perlita Koo is a 51 year old female who presents with right leg pain.  She states approximately one week ago she slipped on the grass falling with her leg out stretched to the side.  She has had discomfort since that time however she developed significant bruising 2 days after the fall and since that time has had increased pain.  Due to the pain not resolving she presents today for evaluation.  She does not have any numbness weakness or tingling.  She is able to bear weight however with difficulty.  There is no secondary fall.  She has no history of clotting disorder.  Has been no loss of bowel or bladder control.  She has tried naproxen without significant relief.  She is otherwise not been seen or evaluated for this.    History obtained via  phone; patient Kinyarwanda-speaking.    Allergies:  No Known Allergies     Medications:      naproxen (NAPROSYN) 500 MG tablet   amitriptyline (ELAVIL) 10 MG tablet   HYDROcodone-acetaminophen (NORCO) 5-325 MG per tablet   ibuprofen (ADVIL/MOTRIN) 600 MG tablet       Problem List:    Patient Active Problem List    Diagnosis Date Noted     Fibromyalgia 02/10/2017     Priority: Medium     Chronic pelvic pain in female 06/14/2016     Priority: Medium     Patient is followed by    [facility] for chronic pelvic pain.     Medication(s): None.   Maximum quantity per month: Not taking.  Clinic visit frequency required: as needed     Controlled substance agreement on file: No    Pain Clinic evaluation in the past: No    DIRE Total Score(s):  No flowsheet data found.    Last [facility] website verification:  none   [email or web address]       Uterine leiomyoma, unspecified location 06/14/2016     Priority: Medium     Anemia 11/20/2015     Priority: Medium     Body mass index (BMI) greater than 30 in adult 11/20/2015     Priority: Medium        Past Medical History:   History  reviewed. No pertinent past medical history.    Past Surgical History:    Past Surgical History:   Procedure Laterality Date     C/SECTION, LOW TRANSVERSE  97    , Low Transverse     tendon surgery right elbow       TUBAL LIGATION  97       Family History:    Family History   Problem Relation Age of Onset     Heart Defect Mother      enlarged heart      Deep Vein Thrombosis Mother      Coronary Artery Disease Father      Breast Cancer Maternal Aunt      Breast Cancer Paternal Aunt        Social History:  Marital Status:   [2]  Social History   Substance Use Topics     Smoking status: Former Smoker     Smokeless tobacco: Never Used     Alcohol use No        Review of Systems   Cardiovascular: Negative for chest pain.   Musculoskeletal:        Right leg pain   Skin:        Right lower extremity bruising.   Neurological: Negative for numbness.   All other systems reviewed and are negative.      Physical Exam   First Vitals:  BP: (!) 155/99  Heart Rate: 84  Temp: 98.6  F (37  C)  Resp: 20  SpO2: 99 %    Physical Exam  General: Alert, Mild  discomfort, well kept   HENT:  Normal voice, No lymphadenopathy  Eyes:  The pupils are equal, round, and reactive to light, Conjunctiva normal, No scleral icterus   Neck:  Normal range of motion  CV:  Normal Pulses  Resp:  Non-labored, No cough  MS:  Normal muscular tone, moves all extremities, Right Knee, Right thigh and Right calf with tenderness, contusion, hematoma and good distal circulation and sensation, compartments appear soft.  Skin:  No rash or acute skin lesions noted  Neuro: Speech is normal and fluent  Psych:  Awake. Alert.  Normal affect.  Appropriate interactions. Good eye contact      Emergency Department Course     Imaging:  Recent Results (from the past 24 hour(s))   US Lower Extremity Venous Duplex Right    Narrative    VENOUS DOPPLER RIGHT LOWER EXTREMITY 5/3/2017 9:00 PM    HISTORY: Right lower extremity pain and swelling.    COMPARISON:  None.    FINDINGS: Color-flow imaging and Doppler waveform spectral analysis  were utilized. There is normal compressibility and spontaneous flow  throughout the right common femoral, superficial femoral, popliteal  and visualized calf veins. An area of bruising in the upper medial  right thigh was examined and shows no evidence for underlying hematoma  or other abnormality.      Impression    IMPRESSION: No evidence for deep venous thrombosis.    ARRON CRUZ MD   Tib/Fib XR, right    Narrative    TIBIA AND FIBULA RIGHT TWO VIEWS  5/3/2017 9:09 PM     HISTORY: Fall pain, bruising    COMPARISON: None.      Impression    IMPRESSION: No bony abnormalities. Mild subcutaneous edema throughout  much of the lower leg.    ARRON CRUZ MD           Interventions:  Medications   HYDROcodone-acetaminophen (NORCO) 5-325 MG per tablet 1 tablet (1 tablet Oral Given 5/3/17 1935)         Emergency Department Course:  Discussed findings with patient while  was on phone.  Discussed plan of care with patient.  After results returned again discussed findings with patient.  She stated that she understood at this time however this was reiterated during discharge with  phone.  I answered her questions to the best of my ability.  She is discharged home.  ED Course       Impression & Plan      Medical Decision Making:  Perlita Koo is a 51 year old female who presents today for evaluation of right lower extremity pain and ecchymosis.  She states 10 days ago she fell with her legs sliding out to the side of her.  2 days after that time she developed significant bruising.  Due to continued bruising and discomfort she presented today to clinic for evaluation.  She was diagnosed with hematoma however patient did not feel completely comfortable waiting for further evaluation as recommended by her primary care provider.  Therefore she came to the ER for further evaluation.  Due to her pain and discomfort  ultrasound and x-ray were obtained.  There is no indication for fracture, malalignment, or dislocation.  Ultrasound shows no evidence for DVT.  Exam is consistent with large hematoma.  She was quite uncomfortable so I do suspect there is a muscle tear versus ligamentous injury.  No indication for compartment syndrome.  She is placed in a knee immobilizer and given crutches.  She was given Norco by her primary doctor today and therefore no prescription was given on discharge.  She is advised to follow-up with orthopedics and will call tomorrow for follow-up.  She'll return to the emergency room if she develops uncontrolled pain, fevers, numbness, weakness, tingling, or further concerns.      Diagnosis:    ICD-10-CM    1. Pain of right lower extremity M79.604    2. Traumatic ecchymosis of thigh, right, initial encounter S70.11XA        Disposition:  discharged to home    Discharge Medications:  Discharge Medication List as of 5/3/2017 10:05 PM            Yung Culp  5/3/2017   [facility] EMERGENCY DEPARTMENT       Yung Culp, MICHAEL CNP  05/04/17 0013

## 2017-05-04 NOTE — DISCHARGE INSTRUCTIONS
Contusión:Extremidad Inferior [Contusion: Lower Extremity]  Usted tiene ryan CONTUSIÓN en ryan extremidad INFERIOR (pierna, rodilla, tobillo, pie o dedos del pie). [place] produce dolor localizado, hinchazón y a veces moretones. No tiene ningún hueso roto. Esta lesión puede tardar varios días o incluso semanas en sanar.  Cuidados En [place]:  1) Mantenga la pierna elevada para reducir el dolor y la hinchazón. Para dormir, coloque ryan almohada debajo de la pierna lesionada. Cuando se siente, apoye la pierna lesionada sobre un objeto que la soporte de forma que quede al mismo nivel que vora cintura. [place] es muy importante mor las primeras 48 horas.  2) Si le recomendaron el uso de MULETAS, no apoye todo vora peso en la pierna lesionada hasta que pueda hacerlo sin sentir dolor. Podrá volver a hacer deportes cuando sea capaz de saltar y correr con la pierna lesionada sin sentir dolor.  3) Para aliviar el dolor mor el primer día, aplique ryan bolsa de hielo (cubitos de hielo en ryan bolsa de plástico, envuelta en ryan toalla) sobre la elizabeth lesionada mor 20 minutos cada 1-2 horas. Continúe esta práctica 3-4 veces al día hasta que el dolor y la hinchazón desaparezcan.  4) Puede usar acetaminofén (Tylenol) o ibuprofeno (Motrin o Advil) para controlar el dolor, a menos que le hayan recetado otro medicamento. [ NOTA : Si tiene ryan enfermedad hepática o renal crónica, o ha tenido alguna vez ryan úlcera estomacal o sangrado gastrointestinal, consulte con vora médico antes de mk estos medicamentos.]  Programe ryan VISITA DE CONTROL con vora médico o con ally centro si no empieza a mejorar en los próximos SHAHBAZ días.  [NOTA: Si le hicieron radiografías, estas serán examinadas por un radiólogo y le informarán de los nuevos hallazgos que puedan afectar la atención médica que necesita.]  Busque Prontamente Atención Médica  si algo de lo siguiente ocurre:  -- Aumento del dolor o aumento de la hinchazón  -- Dedos (del pie) fríos,  azulados, entumecidos o con hormigueo  -- Enrojecimiento, calor o supuración en la piel    [id number] [employer]. [address] [place] [address]. Todos los derechos reservados. Esta información no pretende sustituir la atención médica profesional. Sólo vora médico puede diagnosticar y tratar un problema de rodolfo.          R.I.C.E.  R.I.C.E. es el acrónimo de descansar, ponerse hielo, hacer presión y tener levantada la elizabeth lesionada, son medidas que le ayudan a tener menos dolor e hinchazón después de ryan distensión, esguince, magulladura o golpe britta. Si se ha lesionado, siga estas sugerencias para mk medidas lo antes posible.  [place]  El dolor es la forma que tiene el cuerpo de decir que el área lesionada debe descansar. Ya sea que se haya golpeado el codo, la mano, el pie o la rodilla, limite el uso de la parte lesionada para evitar un mayor daño y ayudarse a sanar.  Hielo  Colocarse hielo inmediatamente después de ryan lesión ayuda a evitar la hinchazón y a calmar el dolor. No ponga el hielo directamente sobre la piel.    Envuelva ryan bolsa de hielo en ryan radha delgada y colóquela sobre el área lesionada.    Póngase el hielo mor 10 minutos cada 3 horas, jose no más de 20 minutos cada vez.  Compresión  Hacer presión (compresión) sobre la lesión ayuda a evitar la hinchazón y sirve de soporte.    Vende firmemente el área lesionada con ryan venda elástica. Si siente cosquilleo en la mano o el pie, cambian de color o los siente fríos cuando los toca, puede ser que el vendaje esté muy ajustado. Koko un nuevo vendaje más flojo.     Si el vendaje se afloja demasiado, vuelva a vendarse.    No use ryan venda elástica mor toda la noche.  Elevación  La elevación es más eficaz cuando se mantiene la lesión elevada más alto que el nivel del corazón. Mantener ryan lesión elevada ayuda a reducir la hinchazón, el dolor y la sensación palpitante.  Llame a vora proveedor de atención médica si nota  cualquiera de estos síntomas:    Los dedos de la mano o del pie están adormecidos, weems cambiado de color o están fríos cuando los toca.    La piel está brillante o estirada.    Empeora el dolor, la hinchazón o el moretón, y no mejoran cuando la lesión se levanta.         [id number] [employer]. [address] [place] [address]. Todos los derechos reservados. Esta información no pretende sustituir la atención médica profesional. Sólo vora médico puede diagnosticar y tratar un problema de rodolfo.          Contusión, Tejidos Blandos [Contusion, Soft Tissue]  Usted tiene ryan CONTUSIÓN que es un moretón con hinchazón (inflamación) y sangrado por debajo de la piel. No hay huesos quebrados. Esta herida jamin de unos días a varias semanas en sanarse.  Cuidado En [place]:  1) Mantenga la parte lastimada elevada para reducir la inflamación/hinchazón. [place] es muy importante mor las primeras 48 horas.  2) Koko ryan compresa fría (pedazos de hielo en ryan bolsa plástica envuelta en ryan toalla) y póngala a la parte afectada por 20 minutos cada ryan (1) a 2 horas mor el primer día. Siga haciendo esto 3 a 4 veces al día hasta que baja la hinchazón.  3) Puede usar acetaminofén (Tylenol) o ibuprofeno (Motrin, Advil) para controlar el dolor, a menos que le receten otro medicamento para el dolor. [ NOTA : Si sufre de enfermedad del hígado o riñones, o alguna vez tuvo ryan úlcera estomacal o sangrado gastrointestinal, hable con vora médico antes de usar estos medicamentos.]  No use ibuprofeno con niños menores de seis meses de edad.  Seguimiento  con vora médico o a esta institución si usted no ha antonia dentro de SHAHBAZ (3) días.  [NOTA: Si le tomaron radiografías (elijah X), serán revisadas por un médico radiólogo. Le informamos de cualquier cambio que pueda afectar vora tratamiento.]  Busque Prontamente Atención Médica  si algo de lo siguiente ocurre:  -- Aumento del dolor o de la hinchazón  -- Enrojecimiento, calentura o  drenaje (supuración) de la piel  -- La pierna o mano herida se pone frío, azuloso, adormecido (le falta sensación) o siente hormigueo  -- Fiebre por encima de los 99.5  F (37.5  C) grados oral    [id number] [employer]. [address] [place] [address]. Todos los derechos reservados. Esta información no pretende sustituir la atención médica profesional. Sólo vora médico puede diagnosticar y tratar un problema de rodolfo.          Cómo se tratan las distensiones y los esguinces  Las distensiones y los esguinces ocurren cuando se tensionan o rompen los músculos u otros tejidos blandos que están cerca de los huesos. Estas lesiones pueden causar moretones, hinchazón y dolor. Para aliviar valdez molestias y hacer que la distensión o esguince mejore más rápido, siga estas sugerencias y recuerde que se pueden demorar de 6 a 8 semanas para curarse.     Nota importante: No les dé aspirina a los niños ni a los adolescentes.         Para ayudar a calmar el dolor y la hinchazón, póngase hielo, véndese y levante la elizabeth lesionada.    [place] hielo, después calor    Use hielo mor las primeras 24-48 horas después de la lesión. El hielo ayuda a evitar la hinchazón y calma el dolor. No use hielo sobre la lesión mor más de 20 minutos cada vez.    Aplique calor después de las primeras 48 horas. El calor relaja los músculos y aumenta el flujo de clotilde. Ponga el área de la lesión en agua tibia o use ryan almohadilla de calor graduada en bajo, no más de 15 minutos cada vez.  Póngase ryan venda y levante la elizabeth lesionada    Vende firmemente la extremidad lesionada con ryan venda elástica; esto le dará soporte y ayudará a evitar la hinchazón. No use ryan venda elástica mor toda la noche.    Levante la elizabeth lesionada para ayudar a disminuir la hinchazón y las punzadas. Es mejor levantar la extremidad lesionada por encima de la altura del corazón.    Medicamentos    Los medicamentos que se venden sin receta, eliana la  aspirina, el acetaminofeno o el ibuprofeno, pueden ayudar a calmar el dolor. Algunos también ayudan a disminuir la hinchazón.    [place] los medicamentos solamente eliana le hayan indicado.    Descanse la elizabeth de la lesión aunque los medicamentos le hayan calmado el dolor.  [place]    Para que la elizabeth lesionada descanse, no la use mor 24 horas.    Mehnaz vez que esté listo, regrese poco a poco a valdez actividades habituales. Descanse frecuentemente el área lesionada.    Si le duele, no use la extremidad lesionada, ni se apoye en lamine para caminar.    [id number] [employer]. [address] [place] [address]. Todos los derechos reservados. Esta información no pretende sustituir la atención médica profesional. Sólo vora médico puede diagnosticar y tratar un problema de rodolfo.

## 2017-05-05 ENCOUNTER — TELEPHONE (OUTPATIENT)
Dept: NURSING | Facility: CLINIC | Age: 52
End: 2017-05-05

## 2017-05-05 NOTE — TELEPHONE ENCOUNTER
Call Type: Triage Call    Presenting Problem: [facility] pharmacist calling to say the  JOEY  had    on the pain medication order. Advised we don't have a list  of JOEY but NPI is in the chart.  Advised patient has to go to urgent  care or the ER to get a new prescription or wait until Monday.  Triage Note:  Guideline Title: No Guideline - Advice Per Reference (Adult)  Recommended Disposition: See Provider within 24 hours  Original Inclination: Did not know what to do  Override Disposition:  Intended Action: Patient does not know  Physician Contacted: No  SEE PROVIDER WITHIN 24 HOURS ?  YES  SEE ED IMMEDIATELY ? NO  CALL POISON CENTER IMMEDIATELY ? NO  CALL LOCAL AGENCY IMMEDIATELY ? NO  SEE DENTIST WITHIN 4 HOURS ? NO  ACTIVATE  ? NO  SEE PROVIDER WITHIN 4 HOURS ? NO  CALL PROVIDER IMMEDIATELY ? NO  Physician Instructions:  Care Advice:

## 2017-06-01 NOTE — TELEPHONE ENCOUNTER
Type of outreach:  Phone, spoke to patient.  Patient declined to schedule at this time. Stated she will call back.   Health Maintenance Due   Topic Date Due     JOSSIE QUESTIONNAIRE 1 YEAR  1965     PHQ-9 Q1YR  1965     URINE DRUG SCREEN Q1 YR  07/31/1981     HPV Q5 YEARS (Complete with PAP)  07/31/1996     MAMMO SCREEN Q2 YR (SYSTEM ASSIGNED)  07/31/2006     LIPID SCREEN Q5 YR FEMALE (SYSTEM ASSIGNED)  07/31/2011     Joslyn Bray CMA ([facility])

## 2017-11-14 ENCOUNTER — OFFICE VISIT (OUTPATIENT)
Dept: PEDIATRICS | Facility: CLINIC | Age: 52
End: 2017-11-14
Payer: COMMERCIAL

## 2017-11-14 VITALS
DIASTOLIC BLOOD PRESSURE: 96 MMHG | OXYGEN SATURATION: 98 % | TEMPERATURE: 97.4 F | RESPIRATION RATE: 16 BRPM | BODY MASS INDEX: 34.39 KG/M2 | WEIGHT: 182 LBS | HEART RATE: 65 BPM | SYSTOLIC BLOOD PRESSURE: 148 MMHG

## 2017-11-14 DIAGNOSIS — N89.8 VAGINAL DISCHARGE: ICD-10-CM

## 2017-11-14 DIAGNOSIS — M54.50 ACUTE LEFT-SIDED LOW BACK PAIN WITHOUT SCIATICA: Primary | ICD-10-CM

## 2017-11-14 LAB
ALBUMIN UR-MCNC: NEGATIVE MG/DL
APPEARANCE UR: CLEAR
BILIRUB UR QL STRIP: NEGATIVE
COLOR UR AUTO: YELLOW
GLUCOSE UR STRIP-MCNC: NEGATIVE MG/DL
HGB UR QL STRIP: ABNORMAL
KETONES UR STRIP-MCNC: NEGATIVE MG/DL
LEUKOCYTE ESTERASE UR QL STRIP: NEGATIVE
NITRATE UR QL: NEGATIVE
NON-SQ EPI CELLS #/AREA URNS LPF: NORMAL /LPF
PH UR STRIP: 5.5 PH (ref 5–7)
RBC #/AREA URNS AUTO: NORMAL /HPF
SOURCE: ABNORMAL
SP GR UR STRIP: <=1.005 (ref 1–1.03)
SPECIMEN SOURCE: NORMAL
UROBILINOGEN UR STRIP-ACNC: 0.2 EU/DL (ref 0.2–1)
WBC #/AREA URNS AUTO: NORMAL /HPF
WET PREP SPEC: NORMAL

## 2017-11-14 PROCEDURE — 99214 OFFICE O/P EST MOD 30 MIN: CPT | Performed by: PHYSICIAN ASSISTANT

## 2017-11-14 PROCEDURE — 87086 URINE CULTURE/COLONY COUNT: CPT | Performed by: PHYSICIAN ASSISTANT

## 2017-11-14 PROCEDURE — 81001 URINALYSIS AUTO W/SCOPE: CPT | Performed by: PHYSICIAN ASSISTANT

## 2017-11-14 PROCEDURE — 87210 SMEAR WET MOUNT SALINE/INK: CPT | Performed by: PHYSICIAN ASSISTANT

## 2017-11-14 NOTE — NURSING NOTE
"Chief Complaint   Patient presents with     Abdominal Pain     Ear Problem       Initial BP (!) 148/96 (BP Location: Right arm, Patient Position: Chair, Cuff Size: Adult Regular)  Pulse 65  Temp 97.4  F (36.3  C) (Tympanic)  Resp 16  Wt 182 lb (82.6 kg)  SpO2 98%  BMI 34.39 kg/m2 Estimated body mass index is 34.39 kg/(m^2) as calculated from the following:    Height as of 5/3/17: 5' 1\" (1.549 m).    Weight as of this encounter: 182 lb (82.6 kg).  Medication Reconciliation: complete.Jez LUGO MA      "

## 2017-11-14 NOTE — MR AVS SNAPSHOT
"              After Visit Summary   2017    Perlita Koo    MRN: [id number]           Patient Information     Date Of Birth          1965        Visit Information        Provider Department      2017 1:30 PM Lilia Silva PA-C; [facility]        Today's Diagnoses     Acute left-sided low back pain without sciatica    -  1    Vaginal discharge           Follow-ups after your visit        Who to contact     If you have questions or need follow up information about today's clinic visit or your schedule please contact [facility] directly at [phone].  Normal or non-critical lab and imaging results will be communicated to you by [employer], letter or phone within 4 business days after the clinic has received the results. If you do not hear from us within 7 days, please contact the clinic through [employer] or phone. If you have a critical or abnormal lab result, we will notify you by phone as soon as possible.  Submit refill requests through [employer] or call your pharmacy and they will forward the refill request to us. Please allow 3 business days for your refill to be completed.          Additional Information About Your Visit        MyChart Information     [employer] lets you send messages to your doctor, view your test results, renew your prescriptions, schedule appointments and more. To sign up, go to [email or web address] . Click on \"Log in\" on the left side of the screen, which will take you to the Welcome page. Then click on \"Sign up Now\" on the right side of the page.     You will be asked to enter the access code listed below, as well as some personal information. Please follow the directions to create your username and password.     Your access code is: [email or web address]  Expires: 2018  4:12 PM     Your access code will  in 90 days. If you need help or a new code, please call your [facility] or [phone].        Care " EveryWhere ID     This is your Care EveryWhere ID. This could be used by other organizations to access your [place] medical records  [id number]        Your Vitals Were     Pulse Temperature Respirations Pulse Oximetry BMI (Body Mass Index)       65 97.4  F (36.3  C) (Tympanic) 16 98% 34.39 kg/m2        Blood Pressure from Last 3 Encounters:   11/14/17 (!) 148/96   05/03/17 (!) 157/94   05/03/17 132/80    Weight from Last 3 Encounters:   11/14/17 182 lb (82.6 kg)   05/03/17 184 lb (83.5 kg)   02/10/17 178 lb 12.8 oz (81.1 kg)              We Performed the Following     *UA reflex to Microscopic and Culture ([place] and [facility] (except [place] and [place])     Urine Culture Aerobic Bacterial     Urine Microscopic     Wet prep        Primary Care Provider Office Phone # Fax #    Ilsa Pereira [phone]       [facility] [place] [address]        Equal Access to Services     [facility]: Hadii aad ku hadasho Soomaali, waaxda luqadaha, qaybta kaalmada adeegyada, waxay moe hayfranny christie . So [facility] [phone].    ATENCIÓN: Si habla español, tiene a vora disposición servicios gratuitos de asistencia lingüística. Llame al [phone].    We comply with applicable federal civil rights laws and [place] laws. We do not discriminate on the basis of race, color, national origin, age, disability, sex, sexual orientation, or gender identity.            Thank you!     Thank you for choosing [facility]  for your care. Our goal is always to provide you with excellent care. Hearing back from our patients is one way we can continue to improve our services. Please take a few minutes to complete the written survey that you may receive in the mail after your visit with us. Thank you!             Your Updated Medication List - Protect others around you: Learn how to safely use, store and throw away your medicines at www.disposemymeds.org.      Notice  As of  11/14/2017  4:12 PM    You have not been prescribed any medications.

## 2017-11-14 NOTE — PROGRESS NOTES
SUBJECTIVE:   Perlita Koo is a 52 year old female who presents to clinic today for the following health issues:   present  ABDOMINAL PAIN     Onset: 3 days    Description:   Character: Sharp  Location: lower abdominal, left and right sides  Radiation: low back left and right side    Intensity: moderate, severe    Progression of Symptoms:  worsening    Accompanying Signs & Symptoms:  Fever/Chills?: no   Gas/Bloating: no   Nausea: no   Vomitting: no   Diarrhea?: no   Constipation:no   Dysuria or Hematuria: no   Urinary frequency or urgency: yes  Holds urine in  Hormonal changes may precipitate symptoms   vaginal discharge and odor  No history of kidney stones   History:   Trauma: no   Previous similar pain: YES   Previous tests done: none    Precipitating factors:   Does the pain change with:     Food: no      BM: no     Urination: no     Alleviating factors:  none    Therapies Tried and outcome: tylenol     LMP:  not applicable   Acute Illness   Acute illness concerns: ear pain  Onset: 1 week    Fever: no    Chills/Sweats: no    Headache (location?): YES    Sinus Pressure:YES    Conjunctivitis:  YES- dryness, allergy eyes    Ear Pain: YES: bilateral    Rhinorrhea: no    Congestion: last week. Now resolved.    Sore Throat: no     Cough: no    Wheeze: no    Decreased Appetite: no    Nausea: no    Vomiting: no    Diarrhea:  no    Dysuria/Freq.: no    Fatigue/Achiness: no    Sick/Strep Exposure: no     Therapies Tried and outcome: tylenol    No recent flights, swimming.     ROS:  ROS otherwise negative    OBJECTIVE:                                                    BP (!) 148/96 (BP Location: Right arm, Patient Position: Chair, Cuff Size: Adult Regular)  Pulse 65  Temp 97.4  F (36.3  C) (Tympanic)  Resp 16  Wt 182 lb (82.6 kg)  SpO2 98%  BMI 34.39 kg/m2  Body mass index is 34.39 kg/(m^2).   GENERAL: alert, no distress  HENT: ear canals- normal; TMs- fluid bilaterally; Nose- normal; Mouth- no  ulcers, no lesions  NECK: tonsillar LAD  RESP: lungs clear to auscultation - no rales, no rhonchi, no wheezes  CV: regular rates and rhythm, normal S1 S2, no S3 or S4 and no murmur, no click or rub  ABDOMEN: soft, no tenderness  Lumber/Thoracic Spine Exam: Tender:  left para lumbar muscles  Range of Motion:  lumbar flexion  decreased, left lateral lumbar bending  decreased, painful, right lateral lumbar bending  decreased, painful  Strength: full    Diagnostic test results:  Results for orders placed or performed in visit on 11/14/17 (from the past 24 hour(s))   *UA reflex to Microscopic and Culture ([facility] (except [place] and [place])   Result Value Ref Range    Color Urine Yellow     Appearance Urine Clear     Glucose Urine Negative NEG^Negative mg/dL    Bilirubin Urine Negative NEG^Negative    Ketones Urine Negative NEG^Negative mg/dL    Specific Gravity Urine <=1.005 1.003 - 1.035    Blood Urine Trace (A) NEG^Negative    pH Urine 5.5 5.0 - 7.0 pH    Protein Albumin Urine Negative NEG^Negative mg/dL    Urobilinogen Urine 0.2 0.2 - 1.0 EU/dL    Nitrite Urine Negative NEG^Negative    Leukocyte Esterase Urine Negative NEG^Negative    Source Midstream Urine    Urine Microscopic   Result Value Ref Range    WBC Urine O - 2 OTO2^O - 2 /HPF    RBC Urine O - 2 OTO2^O - 2 /HPF    Squamous Epithelial /LPF Urine Few FEW^Few /LPF   Wet prep   Result Value Ref Range    Specimen Description Vagina     Wet Prep No yeast seen     Wet Prep No clue cells seen     Wet Prep No Trichomonas seen           ASSESSMENT/PLAN:                                                    (M54.5) Acute left-sided low back pain without sciatica  (primary encounter diagnosis)  Comment: continue with ibuprofen, heat/ice, gentle stretching.   Plan: *UA reflex to Microscopic and Culture ([facility]         [facility] (except [place] and         [place]), Urine Microscopic, Urine Culture         Aerobic Bacterial             (N89.8) Vaginal discharge  Comment:   Plan: Wet prep          See Patient Instructions    Lilia Silva PA-C  [facility]

## 2017-11-15 LAB
BACTERIA SPEC CULT: NO GROWTH
SPECIMEN SOURCE: NORMAL

## 2017-12-27 ENCOUNTER — TELEPHONE (OUTPATIENT)
Dept: PEDIATRICS | Facility: CLINIC | Age: 52
End: 2017-12-27

## 2017-12-31 ENCOUNTER — HEALTH MAINTENANCE LETTER (OUTPATIENT)
Age: 52
End: 2017-12-31

## 2019-07-08 ENCOUNTER — RECORDS - HEALTHEAST (OUTPATIENT)
Dept: LAB | Facility: CLINIC | Age: 54
End: 2019-07-08

## 2019-07-08 LAB
ANION GAP SERPL CALCULATED.3IONS-SCNC: 12 MMOL/L (ref 5–18)
BASOPHILS # BLD AUTO: 0 THOU/UL (ref 0–0.2)
BASOPHILS NFR BLD AUTO: 0 % (ref 0–2)
BUN SERPL-MCNC: 11 MG/DL (ref 8–22)
CALCIUM SERPL-MCNC: 9.8 MG/DL (ref 8.5–10.5)
CHLORIDE BLD-SCNC: 104 MMOL/L (ref 98–107)
CO2 SERPL-SCNC: 24 MMOL/L (ref 22–31)
CREAT SERPL-MCNC: 0.72 MG/DL (ref 0.6–1.1)
EOSINOPHIL # BLD AUTO: 0.4 THOU/UL (ref 0–0.4)
EOSINOPHIL NFR BLD AUTO: 5 % (ref 0–6)
ERYTHROCYTE [DISTWIDTH] IN BLOOD BY AUTOMATED COUNT: 13.8 % (ref 11–14.5)
GFR SERPL CREATININE-BSD FRML MDRD: >60 ML/MIN/1.73M2
GLUCOSE BLD-MCNC: 74 MG/DL (ref 70–125)
HCT VFR BLD AUTO: 40 % (ref 35–47)
HGB BLD-MCNC: 12.8 G/DL (ref 12–16)
LYMPHOCYTES # BLD AUTO: 2.5 THOU/UL (ref 0.8–4.4)
LYMPHOCYTES NFR BLD AUTO: 37 % (ref 20–40)
MCH RBC QN AUTO: 28.9 PG (ref 27–34)
MCHC RBC AUTO-ENTMCNC: 32 G/DL (ref 32–36)
MCV RBC AUTO: 90 FL (ref 80–100)
MONOCYTES # BLD AUTO: 0.4 THOU/UL (ref 0–0.9)
MONOCYTES NFR BLD AUTO: 6 % (ref 2–10)
NEUTROPHILS # BLD AUTO: 3.4 THOU/UL (ref 2–7.7)
NEUTROPHILS NFR BLD AUTO: 51 % (ref 50–70)
PLATELET # BLD AUTO: 278 THOU/UL (ref 140–440)
PMV BLD AUTO: 10 FL (ref 8.5–12.5)
POTASSIUM BLD-SCNC: 4.2 MMOL/L (ref 3.5–5)
RBC # BLD AUTO: 4.43 MILL/UL (ref 3.8–5.4)
SODIUM SERPL-SCNC: 140 MMOL/L (ref 136–145)
WBC: 6.7 THOU/UL (ref 4–11)

## 2019-08-14 ENCOUNTER — RECORDS - HEALTHEAST (OUTPATIENT)
Dept: LAB | Facility: CLINIC | Age: 54
End: 2019-08-14

## 2019-08-14 LAB
ALBUMIN UR-MCNC: NEGATIVE MG/DL
ALT SERPL W P-5'-P-CCNC: 17 U/L (ref 0–45)
APPEARANCE UR: CLEAR
AST SERPL W P-5'-P-CCNC: 24 U/L (ref 0–40)
BILIRUB UR QL STRIP: NEGATIVE
COLOR UR AUTO: YELLOW
ERYTHROCYTE [DISTWIDTH] IN BLOOD BY AUTOMATED COUNT: 13.8 % (ref 11–14.5)
GLUCOSE UR STRIP-MCNC: NEGATIVE MG/DL
HCT VFR BLD AUTO: 41 % (ref 35–47)
HGB BLD-MCNC: 13.2 G/DL (ref 12–16)
HGB UR QL STRIP: NEGATIVE
KETONES UR STRIP-MCNC: NEGATIVE MG/DL
LEUKOCYTE ESTERASE UR QL STRIP: NEGATIVE
MCH RBC QN AUTO: 29.1 PG (ref 27–34)
MCHC RBC AUTO-ENTMCNC: 32.2 G/DL (ref 32–36)
MCV RBC AUTO: 90 FL (ref 80–100)
NITRATE UR QL: NEGATIVE
PH UR STRIP: 6.5 [PH] (ref 4.5–8)
PLATELET # BLD AUTO: 282 THOU/UL (ref 140–440)
PMV BLD AUTO: 10.4 FL (ref 8.5–12.5)
RBC # BLD AUTO: 4.54 MILL/UL (ref 3.8–5.4)
SP GR UR STRIP: 1.01 (ref 1–1.03)
UROBILINOGEN UR STRIP-ACNC: NORMAL
WBC: 5.5 THOU/UL (ref 4–11)

## 2019-08-26 ENCOUNTER — HOSPITAL ENCOUNTER (OUTPATIENT)
Dept: LAB | Age: 54
Setting detail: SPECIMEN
Discharge: HOME | End: 2019-08-26

## 2019-08-27 LAB
HPV SOURCE: NORMAL
HUMAN PAPILLOMA VIRUS 16 DNA: NEGATIVE
HUMAN PAPILLOMA VIRUS 18 DNA: NEGATIVE
HUMAN PAPILLOMA VIRUS FINAL DIAGNOSIS: NORMAL
HUMAN PAPILLOMA VIRUS OTHER HR: NEGATIVE
SPECIMEN DESCRIPTION: NORMAL

## 2019-08-30 ENCOUNTER — RECORDS - HEALTHEAST (OUTPATIENT)
Dept: ADMINISTRATIVE | Facility: OTHER | Age: 54
End: 2019-08-30

## 2019-08-30 LAB
BKR LAB AP ABNORMAL BLEEDING: NO
BKR LAB AP BIRTH CONTROL/HORMONES: NORMAL
BKR LAB AP CERVICAL APPEARANCE: NORMAL
BKR LAB AP GYN ADEQUACY: NORMAL
BKR LAB AP GYN INTERPRETATION: NORMAL
BKR LAB AP HPV REFLEX: NORMAL
BKR LAB AP LMP: NORMAL
BKR LAB AP PATIENT STATUS: NO
BKR LAB AP PREVIOUS ABNORMAL: NORMAL
BKR LAB AP PREVIOUS NORMAL: NORMAL
HIGH RISK?: NO
PATH REPORT.COMMENTS IMP SPEC: NORMAL
RESULT FLAG (HE HISTORICAL CONVERSION): NORMAL

## 2019-10-07 ENCOUNTER — RECORDS - HEALTHEAST (OUTPATIENT)
Dept: LAB | Facility: CLINIC | Age: 54
End: 2019-10-07

## 2019-10-07 LAB
ALBUMIN SERPL-MCNC: 3.8 G/DL (ref 3.5–5)
ALP SERPL-CCNC: 100 U/L (ref 45–120)
ALT SERPL W P-5'-P-CCNC: 20 U/L (ref 0–45)
AST SERPL W P-5'-P-CCNC: 24 U/L (ref 0–40)
BILIRUB DIRECT SERPL-MCNC: <0.1 MG/DL
BILIRUB SERPL-MCNC: 0.3 MG/DL (ref 0–1)
HBA1C MFR BLD: 5.9 % (ref 4.2–6.1)
PROT SERPL-MCNC: 7.5 G/DL (ref 6–8)

## 2021-08-03 ENCOUNTER — HOSPITAL ENCOUNTER (OUTPATIENT)
Dept: ULTRASOUND IMAGING | Facility: CLINIC | Age: 56
End: 2021-08-03
Attending: FAMILY MEDICINE
Payer: COMMERCIAL

## 2021-08-03 ENCOUNTER — HOSPITAL ENCOUNTER (OUTPATIENT)
Dept: MAMMOGRAPHY | Facility: CLINIC | Age: 56
End: 2021-08-03
Attending: FAMILY MEDICINE
Payer: COMMERCIAL

## 2021-08-03 DIAGNOSIS — N64.4 BREAST PAIN: ICD-10-CM

## 2021-08-03 DIAGNOSIS — N60.19 FIBROCYSTIC BREAST CHANGES: ICD-10-CM

## 2021-08-03 PROCEDURE — 77066 DX MAMMO INCL CAD BI: CPT

## 2021-08-03 PROCEDURE — 76642 ULTRASOUND BREAST LIMITED: CPT | Mod: 50
